# Patient Record
Sex: FEMALE | Race: WHITE
[De-identification: names, ages, dates, MRNs, and addresses within clinical notes are randomized per-mention and may not be internally consistent; named-entity substitution may affect disease eponyms.]

---

## 2022-01-05 ENCOUNTER — HOSPITAL ENCOUNTER (OUTPATIENT)
Dept: HOSPITAL 76 - DI.N | Age: 52
Discharge: HOME | End: 2022-01-05
Attending: NURSE PRACTITIONER
Payer: COMMERCIAL

## 2022-01-05 DIAGNOSIS — S56.912A: Primary | ICD-10-CM

## 2022-01-05 DIAGNOSIS — M24.022: ICD-10-CM

## 2022-01-05 NOTE — XRAY REPORT
PROCEDURE:  Elbow 3 View LT

 

INDICATIONS:  STRAIN OF MUSCLES, FASCIA, AND TENDONS OF L FOREARM

 

TECHNIQUE:  3 views of the elbow were acquired.  

 

COMPARISON:  None

 

FINDINGS:  

Bones: Adjacent to the lateral femoral condyle there is a 1 x 3 mm bone fragment which could be chron
ic versus an avulsion fracture. Please correlate with point tenderness. Otherwise no fracture or disl
ocation.  No suspicious bony lesions.  

 

Soft tissues:  No elbow joint effusion.  No suspicious soft tissue calcifications.  

 

IMPRESSION:   Small 1 x 3 mm bone fragment adjacent to the lateral condyle is nonspecific and could b
e chronic, however if it correlates with the area of point tendernesscould be an avulsion fracture.

 

Reviewed by: Arley Gomez on 1/5/2022 4:17 PM PST

Approved by: Arley Gomez on 1/5/2022 4:17 PM PST

 

 

Station ID:  [id number]

## 2023-06-29 ENCOUNTER — HOSPITAL ENCOUNTER (OUTPATIENT)
Dept: HOSPITAL 76 - DI | Age: 53
Discharge: HOME | End: 2023-06-29
Attending: PHYSICIAN ASSISTANT
Payer: MEDICAID

## 2023-06-29 DIAGNOSIS — F45.8: Primary | ICD-10-CM

## 2023-06-29 DIAGNOSIS — E89.0: ICD-10-CM

## 2023-06-30 NOTE — ULTRASOUND REPORT
PROCEDURE:  Head or Neck Soft Tissue

 

INDICATIONS:  FEELING LUMP IN THROAT

 

TECHNIQUE:  

Real-time scanning was performed of the thyroid gland, with image documentation.  

 

COMPARISON:  None

 

FINDINGS:  

Right:  Thyroid lobe is absent.  

Left:  Thyroid lobe measures 4.0 x 1.5 x 1.6 cm, and is homogenous in echotexture.  

Isthmus: Not seen.  

 

 

IMPRESSION:  

 

1. 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

2. Normal left thyroid lobe.

3. A cause for clinical symptoms is not identified on ultrasound.

 

ACR TI-RADS definitions and recommendations:  

TI-RADS 1 (benign): 0 points.  FNA not needed. 

TI-RADS 2 (not suspicious): 2 points.  FNA not needed. 

TI-RADS 3 (mildly suspicious): 3 points. 

 "FNA if 2.5 cm or larger, follow up if 1.5 cm or larger (at 1, 3, and 5 years). 

TI-RADS 4 (moderately suspicious): 4-6 points.  

 "FNA if 1.5 cm or larger, follow up if 1 cm or larger (at 1, 2, 3, and 5 years).  

TI-RADS 5 (highly suspicious): 7 points or more.  

 "FNA if 1 cm or larger, follow up if 0.5 cm or larger (every year for 5 years).  

 

Reviewed by: Lele Macario MD on 6/30/2023 10:33 AM PDT

Approved by: Lele Macario MD on 6/30/2023 10:33 AM PDT

 

 

Station ID:  [id number]